# Patient Record
Sex: MALE | Race: WHITE | HISPANIC OR LATINO | URBAN - METROPOLITAN AREA
[De-identification: names, ages, dates, MRNs, and addresses within clinical notes are randomized per-mention and may not be internally consistent; named-entity substitution may affect disease eponyms.]

---

## 2017-05-11 ENCOUNTER — HOSPITAL ENCOUNTER (EMERGENCY)
Facility: MEDICAL CENTER | Age: 57
End: 2017-05-11
Attending: EMERGENCY MEDICINE
Payer: COMMERCIAL

## 2017-05-11 ENCOUNTER — APPOINTMENT (OUTPATIENT)
Dept: RADIOLOGY | Facility: MEDICAL CENTER | Age: 57
End: 2017-05-11
Attending: EMERGENCY MEDICINE
Payer: COMMERCIAL

## 2017-05-11 VITALS
TEMPERATURE: 97.9 F | WEIGHT: 197.31 LBS | HEIGHT: 66 IN | RESPIRATION RATE: 16 BRPM | BODY MASS INDEX: 31.71 KG/M2 | SYSTOLIC BLOOD PRESSURE: 119 MMHG | DIASTOLIC BLOOD PRESSURE: 92 MMHG | OXYGEN SATURATION: 97 % | HEART RATE: 73 BPM

## 2017-05-11 DIAGNOSIS — S20.211A CHEST WALL CONTUSION, RIGHT, INITIAL ENCOUNTER: ICD-10-CM

## 2017-05-11 LAB — EKG IMPRESSION: NORMAL

## 2017-05-11 PROCEDURE — 71020 DX-CHEST-2 VIEWS: CPT

## 2017-05-11 PROCEDURE — 93005 ELECTROCARDIOGRAM TRACING: CPT

## 2017-05-11 PROCEDURE — 99283 EMERGENCY DEPT VISIT LOW MDM: CPT

## 2017-05-11 ASSESSMENT — LIFESTYLE VARIABLES: DO YOU DRINK ALCOHOL: NO

## 2017-05-11 NOTE — ED AVS SNAPSHOT
5/11/2017    Carlos Delacruz  [address] [facility] [address]    Dear Carlos:    [facility] wants to ensure your discharge home is safe and you or your loved ones have had all of your questions answered regarding your care after you leave the hospital.    Below is a list of resources and contact information should you have any questions regarding your hospital stay, follow-up instructions, or active medical symptoms.    Questions or Concerns Regarding… Contact   Medical Questions Related to Your Discharge  (7 days a week, 8am-5pm) Contact a Nurse Care Coordinator   [phone]   Medical Questions Not Related to Your Discharge  (24 hours a day / 7 days a week)  Contact the Nurse Health Line   [phone]    Medications or Discharge Instructions Refer to your discharge packet   or contact your [facility] Primary Care Provider   [phone]   Follow-up Appointment(s) Schedule your appointment via YYoga   or contact Scheduling [phone]   Billing Review your statement via YYoga  or contact Billing [phone]   Medical Records Review your records via YYoga   or contact Medical Records [phone]     You may receive a telephone call within two days of discharge. This call is to make certain you understand your discharge instructions and have the opportunity to have any questions answered. You can also easily access your medical information, test results and upcoming appointments via the YYoga free online health management tool. You can learn more and sign up at "[employer]/YYoga. For assistance setting up your YYoga account, please call [phone].    Once again, we want to ensure your discharge home is safe and that you have a clear understanding of any next steps in your care. If you have any questions or concerns, please do not hesitate to contact us, we are here for you. Thank you for choosing [facility] for your healthcare needs.    Sincerely,    Your [facility] Healthcare Team

## 2017-05-11 NOTE — LETTER
[facility], EMERGENCY DEPT   [address], [place] [address]  Phone: Dept: [phone] - Fax:        [facility] Progress Report and Disability Certification  Date of Service: 5/11/2017   No Show:  No  Date / Time of Next Visit:     Claim Information   Patient Name: Carlos Delacruz  Claim Number:     Employer:  [employer]  Date of Injury: 5/11/2017     Insurer / TPA:  [employer] ID / SSN: [id number]     Occupation:  Diagnosis: The encounter diagnosis was Chest wall contusion, right, initial encounter.    Medical Information   Related to Industrial Injury? Yes ***   Subjective Complaints:  Chest wall pain   Objective Findings: bruising   Pre-Existing Condition(s):     Assessment:   Condition Same    Status:    Permanent Disability:No    Plan: Medication    Diagnostics: X-ray    Comments:       Disability Information   Status: Released to Restricted Duty    From:     Through:   Restrictions are: Temporary   Physical Restrictions   Sitting:    Standing:    Stooping:    Bending:      Squatting:    Walking:    Climbing:    Pushing:      Pulling:    Other:    Reaching Above Shoulder (L):   Reaching Above Shoulder (R):       Reaching Below Shoulder (L):    Reaching Below Shoulder (R):      Not to exceed Weight Limits   Carrying(hrs):   Weight Limit(lb):   Lifting(hrs):   Weight  Limit(lb):     Comments:      Repetitive Actions   Hands: i.e. Fine Manipulations from Grasping:     Feet: i.e. Operating Foot Controls:     Driving / Operate Machinery:     Physician Name: Alecia Walsh Physician Signature: ALECIA Mcgarry M.D. e-Signature:  , Medical Director   Clinic Name / Location: [facility], EMERGENCY DEPT  [address] [facility] [address]  [phone]     Clinic Phone Number: Dept: [phone]   Appointment Time:  Visit Start Time:    Check-In Time:  11:29 AM Visit Discharge Time:       Original-Treating Physician or Chiropractor    Page 2-Insurer/TPA    Page 3-Employer    Page 4-Employee

## 2017-05-11 NOTE — ED AVS SNAPSHOT
Wellbe Access Code: [email or web address]  Expires: 6/10/2017  2:45 PM    Your email address is not on file at [facility].  Email Addresses are required for you to sign up for Wellbe, please contact [phone] to verify your personal information and to provide your email address prior to attempting to register for Wellbe.    Carlos Delacruz  [address] [place] [address]    Wellbe  A secure, online tool to manage your health information     [facility]’s Wellbe® is a secure, online tool that connects you to your personalized health information from the privacy of your home -- day or night - making it very easy for you to manage your healthcare. Once the activation process is completed, you can even access your medical information using the Wellbe viviana, which is available for free in the Apple Viviana store or Google Play store.     To learn more about Wellbe, visit [email or web address]    There are two levels of access available (as shown below):   My Chart Features  [facility] Primary Care Doctor [facility]  Specialists [facility]  Urgent  Care Non-[facility] Primary Care Doctor   Email your healthcare team securely and privately 24/7 X X X    Manage appointments: schedule your next appointment; view details of past/upcoming appointments X      Request prescription refills. X      View recent personal medical records, including lab and immunizations X X X X   View health record, including health history, allergies, medications X X X X   Read reports about your outpatient visits, procedures, consult and ER notes X X X X   See your discharge summary, which is a recap of your hospital and/or ER visit that includes your diagnosis, lab results, and care plan X X  X     How to register for Wi3t:  Once your e-mail address has been verified, follow the following steps to sign up for Wellbe.     1. Go to  [email or web address]  2. Click on the Sign Up Now box, which takes you to the New Member Sign Up page. You will need to  provide the following information:  a. Enter your Red Kara[employer] Access Code exactly as it appears at the top of this page. (You will not need to use this code after you’ve completed the sign-up process. If you do not sign up before the expiration date, you must request a new code.)   b. Enter your date of birth.   c. Enter your home email address.   d. Click Submit, and follow the next screen’s instructions.  3. Create a Red Karaoke ID. This will be your Red Karaoke login ID and cannot be changed, so think of one that is secure and easy to remember.  4. Create a Red Karaoke password. You can change your password at any time.  5. Enter your Password Reset Question and Answer. This can be used at a later time if you forget your password.   6. Enter your e-mail address. This allows you to receive e-mail notifications when new information is available in Red Karaoke.  7. Click Sign Up. You can now view your health information.    For assistance activating your [employer] K[employer] account, call [phone]

## 2017-05-11 NOTE — DISCHARGE INSTRUCTIONS
Contusión en el tórax   (Chest Contusion)   Zo contusión es un hematoma profundo. Las contusiones ocurren cuando zo lesión provoca un sangrado debajo de la piel. Los signos de hematoma son dolor, inflamación (hinchazón) y cambio de color en la piel. La siomara de la contusión puede ponerse thanh, morada o amarilla.   CUIDADOS EN EL HOGAR   · Aplique hielo sobre la siomara lesionada.  ¨ Ponga el hielo en zo bolsa plástica.  ¨ Colóquese zo toalla entre la piel y la bolsa de hielo.  ¨ Deje el hielo jaqueline 15 a 20 minutos por vez 3 a 4 veces por día, jaqueline las primeras 48 horas.  · Sólo debe osmany la medicación según las indicaciones del médico.  · Nilo reposo.  · Respire profundamente (ejercicios de respiración profunda) según lo indicado por bustos médico.  · Si fuma, abandone el hábito.  · No levante objetos de más de 5 libras (2.3 kg) jaqueline 3 días o más si se lo indica bustos médico.  SOLICITE AYUDA DE INMEDIATO SI:   · Tiene más moretones o hinchazón.  · Siente un dolor que empeora.  · Tiene dificultad para respirar.  · Se siente mareado, débil o se desmaya (se desvanece).  · Observa geena en el pis (orina) o en la materia fecal (heces).  · Tose o vomita geena.  · La hinchazón o el dolor no se alivian con los medicamentos.  ASEGÚRESE DE QUE:   · Comprende estas instrucciones.  · Controlará bustos enfermedad.  · Solicitará ayuda de inmediato si no mejora o si empeora.     Esta información no tiene north fin reemplazar el consejo del médico. Asegúrese de hacerle al médico cualquier pregunta que tenga.     Document Released: 09/11/2013  Elsevier Interactive Patient Education ©2016 Elsevier Inc.

## 2017-05-11 NOTE — ED AVS SNAPSHOT
Home Care Instructions                                                                                                                Carlos Delacruz   MRN: [id number]    Department:  [facility], Emergency Dept   Date of Visit:  5/11/2017            [facility], Emergency Dept    [address] [place] [address]    Phone:  [phone]      You were seen by     Alecia Walsh M.D.      Your Diagnosis Was     Chest wall contusion, right, initial encounter     S20.211A       Follow-up Information     1. Follow up with [facility], Emergency Dept.    Specialty:  Emergency Medicine    Why:  worsening pain, difficulty breathing or other concerns    Contact information    [address] [facility] [address] [phone]        2. Follow up with [facility].    Contact information    [address] [facility] [address] [phone]        Medication Information     Review all of your home medications and newly ordered medications with your primary doctor and/or pharmacist as soon as possible. Follow medication instructions as directed by your doctor and/or pharmacist.     Please keep your complete medication list with you and share with your physician. Update the information when medications are discontinued, doses are changed, or new medications (including over-the-counter products) are added; and carry medication information at all times in the event of emergency situations.               Medication List      Notice     You have not been prescribed any medications.            Procedures and tests performed during your visit     DX-CHEST-2 VIEWS    EKG (NOW)    OLD EKG        Discharge Instructions         Contusión en el tórax   (Chest Contusion)   Tania contusión es un hematoma profundo. Las contusiones ocurren cuando tania lesión provoca un sangrado debajo de la piel. Los signos de hematoma son dolor, inflamación (hinchazón) y cambio de color en la  piel. La siomara de la contusión puede ponerse thanh, morada o amarilla.   CUIDADOS EN EL HOGAR   · Aplique hielo sobre la siomara lesionada.  ¨ Ponga el hielo en zo bolsa plástica.  ¨ Colóquese zo toalla entre la piel y la bolsa de hielo.  ¨ Deje el hielo jaqueline 15 a 20 minutos por vez 3 a 4 veces por día, jaqueline las primeras 48 horas.  · Sólo debe osmany la medicación según las indicaciones del médico.  · Nilo reposo.  · Respire profundamente (ejercicios de respiración profunda) según lo indicado por bustos médico.  · Si fuma, abandone el hábito.  · No levante objetos de más de 5 libras (2.3 kg) jaqueline 3 días o más si se lo indica bustos médico.  SOLICITE AYUDA DE INMEDIATO SI:   · Tiene más moretones o hinchazón.  · Siente un dolor que empeora.  · Tiene dificultad para respirar.  · Se siente mareado, débil o se desmaya (se desvanece).  · Observa geena en el pis (orina) o en la materia fecal (heces).  · Tose o vomita geena.  · La hinchazón o el dolor no se alivian con los medicamentos.  ASEGÚRESE DE QUE:   · Comprende estas instrucciones.  · Controlará bustos enfermedad.  · Solicitará ayuda de inmediato si no mejora o si empeora.     Esta información no tiene north fin reemplazar el consejo del médico. Asegúrese de hacerle al médico cualquier pregunta que tenga.     Document Released: 09/11/2013  MeeDoc Interactive Patient Education ©2016 MeeDoc Inc.              Patient Information     Patient Information    Following emergency treatment: all patient requiring follow-up care must return either to a private physician or a clinic if your condition worsens before you are able to obtain further medical attention, please return to the emergency room.     Billing Information    At [facility], we work to make the billing process streamlined for our patients.  Our Representatives are here to answer any questions you may have regarding your hospital bill.  If you have insurance coverage and have supplied your insurance  information to us, we will submit a claim to your insurer on your behalf.  Should you have any questions regarding your bill, we can be reached online or by phone as follows:  Online: You are able pay your bills online or live chat with our representatives about any billing questions you may have. We are here to help Monday - Friday from 8:00am to 7:30pm and 9:00am - 12:00pm on Saturdays.  Please visit [email or web address]  for more information.   Phone:  [phone] or [phone]    Please note that your emergency physician, surgeon, pathologist, radiologist, anesthesiologist, and other specialists are not employed by [facility] and will therefore bill separately for their services.  Please contact them directly for any questions concerning their bills at the numbers below:     Emergency Physician Services:  [phone]  [facility]:  [phone]  [facility]:  [phone]  [facility]:  [phone]    1. Your final bill may vary from the amount quoted upon discharge if all procedures are not complete at that time, or if your doctor has additional procedures of which we are not aware. You will receive an additional bill if you return to the Emergency Department at [facility] for suture removal regardless of the facility of which the sutures were placed.     2. Please arrange for settlement of this account at the emergency registration.    3. All self-pay accounts are due in full at the time of treatment.  If you are unable to meet this obligation then payment is expected within 4-5 days.     4. If you have had radiology studies (CT, X-ray, Ultrasound, MRI), you have received a preliminary result during your emergency department visit. Please contact the radiology department [phone] to receive a copy of your final result. Please discuss the Final result with your primary physician or with the follow up physician  provided.     Crisis Hotline:  [place] Crisis Hotline:  0-624-FGEILGX or 1-523.972.6254  [place] Crisis Hotline:    1-306.291.4688 or 355-410-4380         ED Discharge Follow Up Questions    1. In order to provide you with very good care, we would like to follow up with a phone call in the next few days.  May we have your permission to contact you?     YES /  NO    2. What is the best phone number to call you? (       )_____-__________    3. What is the best time to call you?      Morning  /  Afternoon  /  Evening                   Patient Signature:  ____________________________________________________________    Date:  ____________________________________________________________

## 2017-05-11 NOTE — ED NOTES
Pt c/o of right sided chest pain. Pt states he was knocked in the chest with metal sheets. Pt is Icelandic speaking. Pt was at work at the time of incident. Pt obvious signs of injury, no visual abnormalities to chest.

## 2017-05-11 NOTE — ED PROVIDER NOTES
"ED Provider Note    Scribed for Alecia Walsh M.D. by Rich Wong. 5/11/2017, 12:44 PM.    Primary care provider: Pcp Pt States None  Means of arrival: walk in  History obtained from: patient  History limited by: none    CHIEF COMPLAINT  Chief Complaint   Patient presents with   • Chest Pain   • T-5000       HPI  Carlos Delacruz is a 56 y.o. male who presents to the Emergency Department with moderate right arm pain and right upper chest wall pain starting this morning. He denies that his pain radiates. Patient was working when a sheet of metal fell off a cart impacting his right chest and arm. He denies difficulty breathing, back pain, neck pain.     REVIEW OF SYSTEMS  Pertinent positives include arm pain, chest wall pain. Pertinent negatives include no shortness of breath, back pain, neck pain.    E.    PAST MEDICAL HISTORY   None noted    SURGICAL HISTORY  patient denies any surgical history    SOCIAL HISTORY  Social History   Substance Use Topics   • Smoking status: Current Every Day Smoker   • Smokeless tobacco: None      Comment: 2 cigarettes per day   • Alcohol Use: Yes      Comment: socially      History   Drug Use No       FAMILY HISTORY  History reviewed. No pertinent family history.    CURRENT MEDICATIONS  No current facility-administered medications for this encounter.  No current outpatient prescriptions on file.    ALLERGIES  No Known Allergies    PHYSICAL EXAM  VITAL SIGNS: /86 mmHg  Pulse 81  Temp(Src) 36.2 °C (97.2 °F)  Resp 17  Ht 1.676 m (5' 5.98\")  Wt 89.5 kg (197 lb 5 oz)  BMI 31.86 kg/m2  SpO2 97%  Constitutional: Alert in no apparent distress. Well apearing  HENT: Normocephalic, Atraumatic, Bilateral external ears normal. Nose normal.   Eyes:  Conjunctiva normal, non-icteric.   Lungs/Thorax: Non-labored respirations. Mild tenderness to right upper chest.  Skin: Warm, Dry, No erythema, No rash.   Neurologic: Alert, Grossly non-focal.   Psychiatric: Affect normal, Judgment normal, " Mood normal, Appears appropriate and not intoxicated.   Extremities:  Slight abrasion to right upper arm.     EKG  12 Lead EKG interpreted by me to show:  Normal sinus rhythm  Rate 80  Axis: Left axis deviation   Intervals: Normal  Normal T waves  Normal ST segments  My impression of this EKG: Does not indicate ischemia or arrythmia at this time.    RADIOLOGY  DX-CHEST-2 VIEWS   Final Result      No acute cardiopulmonary process is identified.      The radiologist's interpretation of all radiological studies have been reviewed by me.    COURSE & MEDICAL DECISION MAKING  Pertinent Labs & Imaging studies reviewed. (See chart for details)    Differential diagnoses include but are not limited to: contusion vs fracture    12:44 PM - Patient seen and examined at bedside. Patient has some mild chest wall tenderness is having no respiratory distress he is not tachycardic. EKG is normal. Chest x-ray was normal. He likely has a chest contusion. I feel he is stable for discharge. Informed the patient that he will be evaluated with radiologic imaging.I instructed him to administer Motrin and Tylenol for pain as needed.  He verbalizes understanding. Ordered DX chest to evaluate his symptoms.       The patient will return for new or worsening symptoms and is stable at the time of discharge. Patient was given return precautions. Patient and/or family member verbalizes understanding and will comply.    DISPOSITION:  Patient will be discharged home in stable condition.    FOLLOW UP:  [facility], Emergency Dept  [address] [facility] [address] [phone]    worsening pain, difficulty breathing or other concerns    [facility]  [address] [place] [address]  [phone]            OUTPATIENT MEDICATIONS:  New Prescriptions    No medications on file           FINAL IMPRESSION  1. Chest wall contusion, right, initial encounter         Your blood pressure is elevated here in the emergency  department. Please monitor your blood pressure over the next several days. If your blood pressure continues to be 120/80 or higher please contact your physician for blood pressure management.     This dictation has been created using voice recognition software and/or scribes. The accuracy of the dictation is limited by the abilities of the software and the expertise of the scribes. I expect there may be some errors of grammar and possibly content. I made every attempt to manually correct the errors within my dictation. However, errors related to voice recognition software and/or scribes may still exist and should be interpreted within the appropriate context.     Rich GREEN (Scribe), am scribing for, and in the presence of, Alecia Walsh M.D..    Electronically signed by: Rich Wong (Scribe), 5/11/2017    Alecia GREEN M.D. personally performed the services described in this documentation, as scribed by Rich Wong in my presence, and it is both accurate and complete.    The note accurately reflects work and decisions made by me.  Alecia Walsh  5/11/2017  2:24 PM